# Patient Record
Sex: FEMALE | Race: WHITE | Employment: FULL TIME | URBAN - METROPOLITAN AREA
[De-identification: names, ages, dates, MRNs, and addresses within clinical notes are randomized per-mention and may not be internally consistent; named-entity substitution may affect disease eponyms.]

---

## 2017-09-20 ENCOUNTER — OFFICE VISIT (OUTPATIENT)
Dept: OTHER | Facility: HOSPITAL | Age: 52
End: 2017-09-20
Attending: PREVENTIVE MEDICINE

## 2017-09-20 DIAGNOSIS — Z01.84 IMMUNITY STATUS TESTING: Primary | ICD-10-CM

## 2017-09-20 LAB
RUBELLA IGG QUANTITATIVE: 105.8 IU/ML
RUBV IGG SER QL: POSITIVE

## 2017-09-20 PROCEDURE — 86765 RUBEOLA ANTIBODY: CPT

## 2017-09-20 PROCEDURE — 86787 VARICELLA-ZOSTER ANTIBODY: CPT

## 2017-09-20 PROCEDURE — 86735 MUMPS ANTIBODY: CPT

## 2017-09-20 PROCEDURE — 86762 RUBELLA ANTIBODY: CPT

## 2017-09-22 LAB
MEV IGG SER IA-ACNC: NEGATIVE
MUV IGG SER IA-ACNC: POSITIVE
VZV IGG SER IA-ACNC: POSITIVE

## 2017-12-16 ENCOUNTER — OFFICE VISIT (OUTPATIENT)
Dept: INTERNAL MEDICINE CLINIC | Facility: CLINIC | Age: 52
End: 2017-12-16

## 2017-12-16 VITALS
HEIGHT: 67 IN | RESPIRATION RATE: 17 BRPM | SYSTOLIC BLOOD PRESSURE: 114 MMHG | HEART RATE: 72 BPM | BODY MASS INDEX: 21.97 KG/M2 | WEIGHT: 140 LBS | DIASTOLIC BLOOD PRESSURE: 66 MMHG

## 2017-12-16 DIAGNOSIS — Z13.1 DIABETES MELLITUS SCREENING: ICD-10-CM

## 2017-12-16 DIAGNOSIS — E03.9 HYPOTHYROIDISM, UNSPECIFIED TYPE: ICD-10-CM

## 2017-12-16 DIAGNOSIS — Z12.39 BREAST CANCER SCREENING: ICD-10-CM

## 2017-12-16 DIAGNOSIS — Z82.0 FAMILY HISTORY OF NEUROPATHY: ICD-10-CM

## 2017-12-16 DIAGNOSIS — Z00.00 ANNUAL PHYSICAL EXAM: Primary | ICD-10-CM

## 2017-12-16 DIAGNOSIS — Z13.0 SCREENING FOR BLOOD DISEASE: ICD-10-CM

## 2017-12-16 DIAGNOSIS — E04.1 THYROID NODULE: ICD-10-CM

## 2017-12-16 DIAGNOSIS — Z12.11 COLON CANCER SCREENING: ICD-10-CM

## 2017-12-16 DIAGNOSIS — Z13.220 LIPID SCREENING: ICD-10-CM

## 2017-12-16 PROCEDURE — 99386 PREV VISIT NEW AGE 40-64: CPT | Performed by: PHYSICIAN ASSISTANT

## 2017-12-16 NOTE — PROGRESS NOTES
Patient presents with:  Physical: no pap. HPI:  Pt presents as a new patient to establish care and for annual physical.  She sees Gyn for well woman care (Cheli). Last saw in 2016. Will schedule annual visit soon.   Hypothyroid - Reports was Ludwin Blair Packs/day: 0.00      Years: 0.00      Smokeless tobacco: Never Used                      Alcohol use: Yes           0.0 oz/week     Comment: occasional        Current Outpatient Prescriptions:  Levothyroxine today). Mammo. Schedule OV with Gyn. Colonosocpy ordered (pt agreed to pursue). Check screening labs. Hypothyroidism, unspecified type  - Check TSH and FT4.   FT3 ordered at pt's request.  Understands that if abnormal she will be referred to Endo for m

## 2018-01-15 ENCOUNTER — LAB ENCOUNTER (OUTPATIENT)
Dept: LAB | Age: 53
End: 2018-01-15
Attending: PHYSICIAN ASSISTANT
Payer: COMMERCIAL

## 2018-01-15 DIAGNOSIS — Z82.0 FAMILY HISTORY OF NEUROPATHY: ICD-10-CM

## 2018-01-15 DIAGNOSIS — E03.9 HYPOTHYROIDISM, UNSPECIFIED TYPE: ICD-10-CM

## 2018-01-15 DIAGNOSIS — Z13.0 SCREENING FOR BLOOD DISEASE: ICD-10-CM

## 2018-01-15 DIAGNOSIS — Z13.220 LIPID SCREENING: ICD-10-CM

## 2018-01-15 DIAGNOSIS — Z00.00 ANNUAL PHYSICAL EXAM: ICD-10-CM

## 2018-01-15 DIAGNOSIS — Z13.1 DIABETES MELLITUS SCREENING: ICD-10-CM

## 2018-01-15 LAB
ALBUMIN SERPL-MCNC: 3.7 G/DL (ref 3.5–4.8)
ALP LIVER SERPL-CCNC: 64 U/L (ref 41–108)
ALT SERPL-CCNC: 23 U/L (ref 14–54)
AST SERPL-CCNC: 28 U/L (ref 15–41)
BASOPHILS # BLD AUTO: 0.02 X10(3) UL (ref 0–0.1)
BASOPHILS NFR BLD AUTO: 0.4 %
BILIRUB SERPL-MCNC: 0.4 MG/DL (ref 0.1–2)
BUN BLD-MCNC: 8 MG/DL (ref 8–20)
CALCIUM BLD-MCNC: 9.1 MG/DL (ref 8.3–10.3)
CHLORIDE: 107 MMOL/L (ref 101–111)
CHOLEST SMN-MCNC: 192 MG/DL (ref ?–200)
CO2: 29 MMOL/L (ref 22–32)
CREAT BLD-MCNC: 0.8 MG/DL (ref 0.55–1.02)
EOSINOPHIL # BLD AUTO: 0.14 X10(3) UL (ref 0–0.3)
EOSINOPHIL NFR BLD AUTO: 2.5 %
ERYTHROCYTE [DISTWIDTH] IN BLOOD BY AUTOMATED COUNT: 11.7 % (ref 11.5–16)
FREE T4: 1.2 NG/DL (ref 0.9–1.8)
GLUCOSE BLD-MCNC: 97 MG/DL (ref 70–99)
HAV AB SERPL IA-ACNC: 676 PG/ML (ref 193–986)
HCT VFR BLD AUTO: 40.4 % (ref 34–50)
HDLC SERPL-MCNC: 71 MG/DL (ref 45–?)
HDLC SERPL: 2.7 {RATIO} (ref ?–4.44)
HGB BLD-MCNC: 13.9 G/DL (ref 12–16)
IMMATURE GRANULOCYTE COUNT: 0.01 X10(3) UL (ref 0–1)
IMMATURE GRANULOCYTE RATIO %: 0.2 %
LDLC SERPL CALC-MCNC: 107 MG/DL (ref ?–130)
LYMPHOCYTES # BLD AUTO: 2.03 X10(3) UL (ref 0.9–4)
LYMPHOCYTES NFR BLD AUTO: 36.2 %
M PROTEIN MFR SERPL ELPH: 7 G/DL (ref 6.1–8.3)
MCH RBC QN AUTO: 31.7 PG (ref 27–33.2)
MCHC RBC AUTO-ENTMCNC: 34.4 G/DL (ref 31–37)
MCV RBC AUTO: 92 FL (ref 81–100)
MONOCYTES # BLD AUTO: 0.5 X10(3) UL (ref 0.1–0.6)
MONOCYTES NFR BLD AUTO: 8.9 %
NEUTROPHIL ABS PRELIM: 2.91 X10 (3) UL (ref 1.3–6.7)
NEUTROPHILS # BLD AUTO: 2.91 X10(3) UL (ref 1.3–6.7)
NEUTROPHILS NFR BLD AUTO: 51.8 %
NONHDLC SERPL-MCNC: 121 MG/DL (ref ?–130)
PLATELET # BLD AUTO: 213 10(3)UL (ref 150–450)
POTASSIUM SERPL-SCNC: 4.6 MMOL/L (ref 3.6–5.1)
RBC # BLD AUTO: 4.39 X10(6)UL (ref 3.8–5.1)
RED CELL DISTRIBUTION WIDTH-SD: 39.7 FL (ref 35.1–46.3)
SODIUM SERPL-SCNC: 140 MMOL/L (ref 136–144)
T3FREE SERPL-MCNC: 2.34 PG/ML (ref 2.3–4.2)
TRIGL SERPL-MCNC: 68 MG/DL (ref ?–150)
TSI SER-ACNC: 1.45 MIU/ML (ref 0.35–5.5)
VLDLC SERPL CALC-MCNC: 14 MG/DL (ref 5–40)
WBC # BLD AUTO: 5.6 X10(3) UL (ref 4–13)

## 2018-01-15 PROCEDURE — 84481 FREE ASSAY (FT-3): CPT

## 2018-01-15 PROCEDURE — 36415 COLL VENOUS BLD VENIPUNCTURE: CPT

## 2018-01-15 PROCEDURE — 85025 COMPLETE CBC W/AUTO DIFF WBC: CPT

## 2018-01-15 PROCEDURE — 82607 VITAMIN B-12: CPT

## 2018-01-15 PROCEDURE — 80061 LIPID PANEL: CPT

## 2018-01-15 PROCEDURE — 84443 ASSAY THYROID STIM HORMONE: CPT

## 2018-01-15 PROCEDURE — 84439 ASSAY OF FREE THYROXINE: CPT

## 2018-01-15 PROCEDURE — 80053 COMPREHEN METABOLIC PANEL: CPT

## 2019-01-03 ENCOUNTER — OFFICE VISIT (OUTPATIENT)
Dept: OTHER | Facility: HOSPITAL | Age: 54
End: 2019-01-03
Attending: PREVENTIVE MEDICINE
Payer: OTHER MISCELLANEOUS

## 2019-01-03 DIAGNOSIS — Z02.89 VISIT FOR OCCUPATIONAL HEALTH EXAMINATION: Primary | ICD-10-CM

## 2019-01-03 RX ORDER — AZITHROMYCIN 250 MG/1
TABLET, FILM COATED ORAL
Qty: 6 TABLET | Refills: 0 | Status: SHIPPED | OUTPATIENT
Start: 2019-01-03 | End: 2019-02-06 | Stop reason: ALTCHOICE

## 2019-01-03 NOTE — PATIENT INSTRUCTIONS
Z Serge  Take as prescribed    Follow up if needed      Azithromycin tablets  Brand Names: Zithromax, Zithromax Tri-Serge, Zithromax Z-Serge  What is this medicine? AZITHROMYCIN (az ith humaira MYCECILLE sin) is a macrolide antibiotic.  It is used to treat or prevent cert Do not take this medicine with any of the following medications:  · lincomycin  This medicine may also interact with the following medications:  · amiodarone  · antacids  · birth control pills  · cyclosporine  · digoxin  · magnesium  · nelfinavir  · phenyt

## 2019-02-06 ENCOUNTER — OFFICE VISIT (OUTPATIENT)
Dept: FAMILY MEDICINE CLINIC | Facility: CLINIC | Age: 54
End: 2019-02-06
Payer: COMMERCIAL

## 2019-02-06 VITALS
OXYGEN SATURATION: 95 % | SYSTOLIC BLOOD PRESSURE: 120 MMHG | DIASTOLIC BLOOD PRESSURE: 74 MMHG | TEMPERATURE: 99 F | HEIGHT: 66.5 IN | BODY MASS INDEX: 21.12 KG/M2 | HEART RATE: 85 BPM | WEIGHT: 133 LBS

## 2019-02-06 DIAGNOSIS — J40 BRONCHITIS: Primary | ICD-10-CM

## 2019-02-06 PROCEDURE — 99213 OFFICE O/P EST LOW 20 MIN: CPT | Performed by: FAMILY MEDICINE

## 2019-02-06 RX ORDER — PREDNISONE 20 MG/1
TABLET ORAL
Qty: 10 TABLET | Refills: 0 | Status: SHIPPED | OUTPATIENT
Start: 2019-02-06 | End: 2019-04-04

## 2019-02-06 NOTE — PATIENT INSTRUCTIONS
Take prednisone-- 2 tabs once daily for 5 days. Take with food. While you are on steroids it is preferred that you take Tylenol for pain if needed rather than ibuprofen (Motrin/Ibuprofen) or Aleve. Use dayquil/nyquil as needed.  Or you can use delsym for

## 2019-02-06 NOTE — PROGRESS NOTES
CHIEF COMPLAINT:   Patient presents with:  Cough: cough is with phlegm, runny nose, nose congestion, drainage x 3  dys         HPI:   Savanna Mcdonough is a 46year old female who presents for cough for  3  days.   Cough started gradually and is described as ti throat. NECK: supple, non-tender. LUNGS: Normal respiratory rate. Normal effort. Loose cough. B/l rhonchi and wheezing. No rales or crackles. No decreased BS. Normal egophony.   CARDIO: RRR without murmur  LYMPH: No cervical or supraclavicular lymphadeno

## 2019-03-13 ENCOUNTER — TELEPHONE (OUTPATIENT)
Dept: INTERNAL MEDICINE CLINIC | Facility: CLINIC | Age: 54
End: 2019-03-13

## 2019-03-13 DIAGNOSIS — Z00.00 ROUTINE GENERAL MEDICAL EXAMINATION AT A HEALTH CARE FACILITY: Primary | ICD-10-CM

## 2019-03-13 NOTE — TELEPHONE ENCOUNTER
Future Appointments   Date Time Provider Otto Chauhan   4/4/2019  2:00 PM Patrick White PA-C [facility]     Orders to edward-  Pt aware to fast-no call back required

## 2019-04-04 ENCOUNTER — OFFICE VISIT (OUTPATIENT)
Dept: INTERNAL MEDICINE CLINIC | Facility: CLINIC | Age: 54
End: 2019-04-04
Payer: COMMERCIAL

## 2019-04-04 VITALS
DIASTOLIC BLOOD PRESSURE: 74 MMHG | HEART RATE: 76 BPM | HEIGHT: 66.5 IN | SYSTOLIC BLOOD PRESSURE: 118 MMHG | BODY MASS INDEX: 21.66 KG/M2 | WEIGHT: 136.38 LBS | RESPIRATION RATE: 16 BRPM

## 2019-04-04 DIAGNOSIS — Z12.11 COLON CANCER SCREENING: ICD-10-CM

## 2019-04-04 DIAGNOSIS — Z12.39 SCREENING BREAST EXAMINATION: ICD-10-CM

## 2019-04-04 DIAGNOSIS — Z00.00 ANNUAL PHYSICAL EXAM: Primary | ICD-10-CM

## 2019-04-04 DIAGNOSIS — E03.9 HYPOTHYROIDISM, UNSPECIFIED TYPE: ICD-10-CM

## 2019-04-04 DIAGNOSIS — E04.1 THYROID NODULE: ICD-10-CM

## 2019-04-04 DIAGNOSIS — L98.9 SKIN LESION: ICD-10-CM

## 2019-04-04 PROCEDURE — 99396 PREV VISIT EST AGE 40-64: CPT | Performed by: PHYSICIAN ASSISTANT

## 2019-04-04 NOTE — PROGRESS NOTES
Patient presents with:  Physical: cn room 6: patient is here for a physical and discuss what labs she should have      HPI:   Pt presents for annual physical.  Sees gyn for well woman care. Pt is exercising regularly. Doing Yoga a few times a month. 2-4 times a month      Drinks per session: 1 or 2      Binge frequency: Never      Comment: cage done 4/4/19    Drug use: No        Current Outpatient Medications:  Levothyroxine Sodium (UNITHROID) 50 MCG Oral Tab Take 50 mcg by mouth before breakfast. Dis exam  (primary encounter diagnosis) - Sees Gyn tomorrow. Mammo ordered. Discussed colon ca screening methods with pt, she would like to proceed with stool cards. Check screening labs. Thyroid nodule - Overdue for US to recheck.   Hypothyroidism, unspeci

## 2019-04-05 PROCEDURE — 88175 CYTOPATH C/V AUTO FLUID REDO: CPT | Performed by: NURSE PRACTITIONER

## 2019-04-05 PROCEDURE — 87624 HPV HI-RISK TYP POOLED RSLT: CPT | Performed by: NURSE PRACTITIONER

## 2019-04-06 ENCOUNTER — HOSPITAL ENCOUNTER (OUTPATIENT)
Dept: MAMMOGRAPHY | Age: 54
Discharge: HOME | End: 2019-04-06
Attending: PHYSICIAN ASSISTANT
Payer: COMMERCIAL

## 2019-04-06 DIAGNOSIS — Z12.39 SCREENING BREAST EXAMINATION: ICD-10-CM

## 2019-04-06 PROCEDURE — 77067 SCR MAMMO BI INCL CAD: CPT | Performed by: PHYSICIAN ASSISTANT

## 2019-04-06 PROCEDURE — 77063 BREAST TOMOSYNTHESIS BI: CPT | Performed by: PHYSICIAN ASSISTANT

## 2019-04-08 NOTE — PROGRESS NOTES
Benign findings on mammogram but note is made of dense breast tissue. Due to the dense breast tissue a recommendation for Molecular Breast Imaging (MBI) has been made.   This is a supplemental screening test with increased sensitivity for detecting breast

## 2019-04-13 ENCOUNTER — HOSPITAL ENCOUNTER (OUTPATIENT)
Dept: ULTRASOUND IMAGING | Age: 54
Discharge: HOME | End: 2019-04-13
Attending: PHYSICIAN ASSISTANT
Payer: COMMERCIAL

## 2019-04-13 DIAGNOSIS — E04.1 THYROID NODULE: ICD-10-CM

## 2019-04-13 PROCEDURE — 76536 US EXAM OF HEAD AND NECK: CPT | Performed by: PHYSICIAN ASSISTANT

## 2019-04-14 NOTE — PROGRESS NOTES
Two small thyroid nodules noted. Both less than 1 cm in any dimension. Cannot comment on stability as no prior study to compare to. Does pt have any prior reports that would list size, or can she get records from prior Endo who she was seeing?   F/u US r

## 2019-04-15 ENCOUNTER — MED REC SCAN ONLY (OUTPATIENT)
Dept: INTERNAL MEDICINE CLINIC | Facility: CLINIC | Age: 54
End: 2019-04-15

## 2019-04-15 ENCOUNTER — TELEPHONE (OUTPATIENT)
Dept: INTERNAL MEDICINE CLINIC | Facility: CLINIC | Age: 54
End: 2019-04-15

## 2019-04-15 RX ORDER — LEVOTHYROXINE SODIUM 0.05 MG/1
50 TABLET ORAL
Qty: 90 TABLET | Refills: 1 | Status: SHIPPED | OUTPATIENT
Start: 2019-04-15 | End: 2019-11-06

## 2019-04-15 NOTE — PROGRESS NOTES
I did not remember and did not see anything under imaging; I looked under media and reviewed the prior records. Nodules are smaller than previous US results show. Repeat US in 1 year.

## 2019-04-15 NOTE — TELEPHONE ENCOUNTER
Pt needs CB to send in rx for Levothyroxine Sodium (UNITHROID) 50 MCG Oral Tab-send to local [facility] w/refills-she is now going to start getting this med from CB-she has never filled for her before

## 2019-04-15 NOTE — TELEPHONE ENCOUNTER
Called pt to inform rx Levothyroxine Sodium (UNITHROID) 50 MCG #90+ 1rf sent to [address] [place] [address] listed as requested. Pt verbalized understanding and agreed with POC.

## 2019-04-25 ENCOUNTER — LAB ENCOUNTER (OUTPATIENT)
Dept: LAB | Facility: HOSPITAL | Age: 54
End: 2019-04-25
Attending: PHYSICIAN ASSISTANT
Payer: COMMERCIAL

## 2019-04-25 ENCOUNTER — LAB ENCOUNTER (OUTPATIENT)
Dept: LAB | Age: 54
End: 2019-04-25
Attending: PHYSICIAN ASSISTANT
Payer: COMMERCIAL

## 2019-04-25 DIAGNOSIS — E03.9 HYPOTHYROIDISM, UNSPECIFIED TYPE: ICD-10-CM

## 2019-04-25 DIAGNOSIS — Z00.00 ROUTINE GENERAL MEDICAL EXAMINATION AT A HEALTH CARE FACILITY: ICD-10-CM

## 2019-04-25 DIAGNOSIS — Z12.11 COLON CANCER SCREENING: ICD-10-CM

## 2019-04-25 PROCEDURE — 82274 ASSAY TEST FOR BLOOD FECAL: CPT

## 2019-04-25 PROCEDURE — 84481 FREE ASSAY (FT-3): CPT

## 2019-04-25 PROCEDURE — 84439 ASSAY OF FREE THYROXINE: CPT

## 2019-04-25 PROCEDURE — 84443 ASSAY THYROID STIM HORMONE: CPT

## 2019-04-25 PROCEDURE — 80061 LIPID PANEL: CPT

## 2019-04-25 PROCEDURE — 80053 COMPREHEN METABOLIC PANEL: CPT

## 2019-04-25 PROCEDURE — 36415 COLL VENOUS BLD VENIPUNCTURE: CPT

## 2019-04-25 PROCEDURE — 85025 COMPLETE CBC W/AUTO DIFF WBC: CPT

## 2019-05-08 ENCOUNTER — TELEPHONE (OUTPATIENT)
Dept: INTERNAL MEDICINE CLINIC | Facility: CLINIC | Age: 54
End: 2019-05-08

## 2019-05-08 DIAGNOSIS — Z12.11 COLON CANCER SCREENING: Primary | ICD-10-CM

## 2019-05-08 NOTE — TELEPHONE ENCOUNTER
Dominic chester called and stated that the lab order \"test feces for blood\" needs to be an order for OCCULT BLOOD, FECAL, IMMUNOASSAY     Please advise

## 2019-05-24 ENCOUNTER — TELEPHONE (OUTPATIENT)
Dept: INTERNAL MEDICINE CLINIC | Facility: CLINIC | Age: 54
End: 2019-05-24

## 2019-05-24 NOTE — TELEPHONE ENCOUNTER
Pt came to drop off Health Provider Screening Form for completion. Placing in yellow fax folder up front.   Please fax when done    Thank you

## 2019-06-14 ENCOUNTER — APPOINTMENT (OUTPATIENT)
Dept: LAB | Age: 54
End: 2019-06-14
Attending: DERMATOLOGY
Payer: COMMERCIAL

## 2019-06-14 DIAGNOSIS — C44.41 BCC (BASAL CELL CARCINOMA), SCALP/NECK: ICD-10-CM

## 2019-06-14 DIAGNOSIS — D48.5 NEOPLASM OF UNCERTAIN BEHAVIOR OF SKIN: ICD-10-CM

## 2019-06-14 DIAGNOSIS — C44.612 BASAL CELL CARCINOMA (BCC) OF RIGHT SHOULDER: ICD-10-CM

## 2019-06-14 PROCEDURE — 88305 TISSUE EXAM BY PATHOLOGIST: CPT

## 2019-07-05 ENCOUNTER — APPOINTMENT (OUTPATIENT)
Dept: LAB | Age: 54
End: 2019-07-05
Attending: DERMATOLOGY
Payer: COMMERCIAL

## 2019-07-05 DIAGNOSIS — C44.619 BASAL CELL CARCINOMA (BCC) OF LEFT SHOULDER: ICD-10-CM

## 2019-07-05 PROCEDURE — 88305 TISSUE EXAM BY PATHOLOGIST: CPT

## 2019-11-06 RX ORDER — LEVOTHYROXINE SODIUM 50 UG/1
TABLET ORAL
Qty: 90 TABLET | Refills: 0 | Status: SHIPPED | OUTPATIENT
Start: 2019-11-06 | End: 2020-02-06

## 2020-02-06 RX ORDER — LEVOTHYROXINE SODIUM 50 UG/1
TABLET ORAL
Qty: 90 TABLET | Refills: 0 | Status: SHIPPED | OUTPATIENT
Start: 2020-02-06 | End: 2020-04-14

## 2020-02-07 ENCOUNTER — TELEPHONE (OUTPATIENT)
Dept: INTERNAL MEDICINE CLINIC | Facility: CLINIC | Age: 55
End: 2020-02-07

## 2020-02-07 DIAGNOSIS — Z13.29 SCREENING FOR THYROID DISORDER: ICD-10-CM

## 2020-02-07 DIAGNOSIS — Z00.00 ROUTINE GENERAL MEDICAL EXAMINATION AT A HEALTH CARE FACILITY: Primary | ICD-10-CM

## 2020-02-07 DIAGNOSIS — Z13.0 SCREENING FOR BLOOD DISEASE: ICD-10-CM

## 2020-02-07 DIAGNOSIS — Z13.228 SCREENING FOR METABOLIC DISORDER: ICD-10-CM

## 2020-02-07 DIAGNOSIS — Z13.220 SCREENING FOR LIPID DISORDERS: ICD-10-CM

## 2020-02-07 NOTE — TELEPHONE ENCOUNTER
Future Appointments   Date Time Provider Otto Gissel   3/7/2020  8:00 AM Livia Cunningham PA-C [facility]     Orders to edward- Pt informed that labs need to be completed no sooner than 2 weeks prior to the appt.  Pt aware to fast-no call b

## 2020-02-11 ENCOUNTER — TELEPHONE (OUTPATIENT)
Dept: INTERNAL MEDICINE CLINIC | Facility: CLINIC | Age: 55
End: 2020-02-11

## 2020-02-11 NOTE — TELEPHONE ENCOUNTER
Future Appointments   Date Time Provider Otto Gissel   4/29/2020  8:00 AM Isidro Guevara PA-C [facility]     Orders to edward-  Pt informed that labs need to be completed no sooner than 2 weeks prior to the appt.  Pt aware to fast-no call

## 2020-03-17 ENCOUNTER — TELEPHONE (OUTPATIENT)
Dept: INTERNAL MEDICINE CLINIC | Facility: CLINIC | Age: 55
End: 2020-03-17

## 2020-03-17 RX ORDER — AZITHROMYCIN 250 MG/1
TABLET, FILM COATED ORAL
Qty: 6 TABLET | Refills: 0 | Status: SHIPPED | OUTPATIENT
Start: 2020-03-17 | End: 2020-04-14

## 2020-03-17 NOTE — TELEPHONE ENCOUNTER
D/w pt. She has productive cough, congestion, and some SOB with activity today. No travel history or contact with anyone with known coronavirus. No fevers. Does not meet criteria for coronavirus testing.  Will send azithromycin for her, can use otc cough

## 2020-03-17 NOTE — TELEPHONE ENCOUNTER
Pt works for [employer]. Went upstairs and was short of breath, pt stated no fever, also has a cold and cough, it's productive, mostly in the am.  Was sent home from work and advised to pcp. Please advise.

## 2020-03-17 NOTE — TELEPHONE ENCOUNTER
C/o SOB at work while going up stairs. Happen again after she was sent home from work going up stairs in garage. Cough more in the mornings - productive - can have color to it.      Cold symptoms for about a week    SOB is not something that has happen

## 2020-03-20 ENCOUNTER — PATIENT MESSAGE (OUTPATIENT)
Dept: INTERNAL MEDICINE CLINIC | Facility: CLINIC | Age: 55
End: 2020-03-20

## 2020-03-20 NOTE — TELEPHONE ENCOUNTER
From: Augusta Hogan  To: Darian Franco MD  Sent: 3/20/2020 4:19 PM CDT  Subject: Other    Good afternoon. Just wanted to update you. This morning I recorded my first elevated temp of 99.5. This afternoon it is slightly higher but still below 100.  I've

## 2020-03-26 ENCOUNTER — TELEPHONE (OUTPATIENT)
Dept: INTERNAL MEDICINE CLINIC | Facility: CLINIC | Age: 55
End: 2020-03-26

## 2020-03-26 DIAGNOSIS — R05.9 COUGH: ICD-10-CM

## 2020-03-26 DIAGNOSIS — R07.89 CHEST TIGHTNESS: Primary | ICD-10-CM

## 2020-03-26 DIAGNOSIS — Z87.898 HISTORY OF FEVER: ICD-10-CM

## 2020-03-26 PROCEDURE — 99441 PHONE E/M BY PHYS 5-10 MIN: CPT | Performed by: INTERNAL MEDICINE

## 2020-03-26 RX ORDER — ALBUTEROL SULFATE 90 UG/1
2 AEROSOL, METERED RESPIRATORY (INHALATION) EVERY 4 HOURS PRN
Qty: 1 INHALER | Refills: 1 | Status: SHIPPED | OUTPATIENT
Start: 2020-03-26

## 2020-03-26 NOTE — TELEPHONE ENCOUNTER
Virtual/Telephone Check-In    Christo Geiger verbally consents to a Virtual/Telephone Check-In service on 03/26/20. Patient understands and accepts financial responsibility for any deductible, co-insurance and/or co-pays associated with this service.     Du

## 2020-03-26 NOTE — TELEPHONE ENCOUNTER
Patient is experiencing the shortness of breath just walking around again the last two days and a rapid pulse, worse yesterday. No fever since Monday. Some coughing and slightly productive.   When patient takes a deep breath it's not the way it was a week

## 2020-03-30 ENCOUNTER — HOSPITAL ENCOUNTER (OUTPATIENT)
Dept: GENERAL RADIOLOGY | Facility: HOSPITAL | Age: 55
Discharge: HOME | End: 2020-03-30
Attending: INTERNAL MEDICINE
Payer: COMMERCIAL

## 2020-03-30 DIAGNOSIS — Z87.898 HISTORY OF FEVER: ICD-10-CM

## 2020-03-30 DIAGNOSIS — R07.89 CHEST TIGHTNESS: ICD-10-CM

## 2020-03-30 DIAGNOSIS — R05.9 COUGH: ICD-10-CM

## 2020-03-30 PROCEDURE — 71046 X-RAY EXAM CHEST 2 VIEWS: CPT | Performed by: INTERNAL MEDICINE

## 2020-03-31 ENCOUNTER — TELEPHONE (OUTPATIENT)
Dept: INTERNAL MEDICINE CLINIC | Facility: CLINIC | Age: 55
End: 2020-03-31

## 2020-03-31 DIAGNOSIS — R50.9 FEVER AND CHILLS: Primary | ICD-10-CM

## 2020-03-31 DIAGNOSIS — R07.89 CHEST TIGHTNESS: ICD-10-CM

## 2020-03-31 DIAGNOSIS — R05.3 PERSISTENT COUGH: ICD-10-CM

## 2020-03-31 NOTE — TELEPHONE ENCOUNTER
cxr reviewed with patient  She still has fever (99F), cough, some chest tightness. Albuterol and tylenol helps. Finished zpak. Works in [employer] and would like to know when she could go back.   Will order COVID 19 testing due to fevers, cough, health care worke

## 2020-04-01 ENCOUNTER — TELEPHONE (OUTPATIENT)
Dept: INTERNAL MEDICINE CLINIC | Facility: CLINIC | Age: 55
End: 2020-04-01

## 2020-04-01 NOTE — TELEPHONE ENCOUNTER
Patient notified Henrine Pill 19 testing denied. Pt will be seeing [facility] Friday 4/3/20 for a Physical Exam.  Pt has paperwork for [employer] [facility] [employer] that TB needs to fill out for FMLA, 1st day off of work was 3/17/20 and back to work on 4/7/20.   Pt will fax to our

## 2020-04-01 NOTE — TELEPHONE ENCOUNTER
FW: COVID testing   Received: Today   Message Contents   Victorina Leal MD  P [facility]             Please let pt know COVID 19 test denied and cancel the test.  I did tell her that it may be denied due to availability yesterday.  I would advi

## 2020-04-06 NOTE — TELEPHONE ENCOUNTER
D/w patient. Still with low grade fever (99.2F) and SOB at times. Not feeling good overall. Discussed I do not recommend going back to work until fever free for at least 72 hours and symptoms resolved for 1 week. I will write a letter stating above.  Will

## 2020-04-07 ENCOUNTER — TELEPHONE (OUTPATIENT)
Dept: INTERNAL MEDICINE CLINIC | Facility: CLINIC | Age: 55
End: 2020-04-07

## 2020-04-07 NOTE — TELEPHONE ENCOUNTER
Received paperwork from [employer] in regards to diability benefits. Placed in TB bin for review/completion.

## 2020-04-08 NOTE — TELEPHONE ENCOUNTER
Calling pt for f/u call on how she is feeling.     Pt states she is doing ok, T=99.0 this am, still has some shortness of breath but not worsening from the call with TB, started as SOB with exertion and now if she is reading a book has some shortness of christiano

## 2020-04-08 NOTE — TELEPHONE ENCOUNTER
Patient notified we will talk to her Monday to get an update on how she is feeling and possibly schedule appt with TB for Tuesday. Pt verbalizes understanding and agreeable to plan. Hold for Monday 4/13/20.

## 2020-04-08 NOTE — TELEPHONE ENCOUNTER
Yes our employee health cleared her back to work, her SOB may be related to anxiety. CXR was normal. Need to document pulse ox and evaluate her in OV in order for her to continue to be off from work. Please see how symptoms are Monday.  May need OV with m

## 2020-04-09 ENCOUNTER — TELEPHONE (OUTPATIENT)
Dept: INTERNAL MEDICINE CLINIC | Facility: CLINIC | Age: 55
End: 2020-04-09

## 2020-04-09 NOTE — TELEPHONE ENCOUNTER
Called to see how she is feeling. Having a better day with no headache finally. SOB is improved. Last temp yesterday 99F  Symptoms started 3/17, CXR negative, COVID testing denied.   Spoke with [facility], unable to do COVID testing at this kari

## 2020-04-09 NOTE — TELEPHONE ENCOUNTER
TB indicates she talked to pt and [address] [place] [address] today, If pt is not better by Monday 4/13/20 then she has to go to the ER for work up of shortness of breath. No visit to our clinic per TB.

## 2020-04-14 ENCOUNTER — HOSPITAL ENCOUNTER (EMERGENCY)
Facility: HOSPITAL | Age: 55
Discharge: HOME | End: 2020-04-14
Attending: EMERGENCY MEDICINE
Payer: COMMERCIAL

## 2020-04-14 VITALS
DIASTOLIC BLOOD PRESSURE: 63 MMHG | TEMPERATURE: 98 F | SYSTOLIC BLOOD PRESSURE: 116 MMHG | HEART RATE: 68 BPM | OXYGEN SATURATION: 98 % | RESPIRATION RATE: 16 BRPM | BODY MASS INDEX: 22.5 KG/M2 | WEIGHT: 140 LBS | HEIGHT: 66 IN

## 2020-04-14 DIAGNOSIS — R06.00 DYSPNEA, UNSPECIFIED TYPE: Primary | ICD-10-CM

## 2020-04-14 PROCEDURE — 93010 ELECTROCARDIOGRAM REPORT: CPT

## 2020-04-14 PROCEDURE — 84484 ASSAY OF TROPONIN QUANT: CPT | Performed by: EMERGENCY MEDICINE

## 2020-04-14 PROCEDURE — 93005 ELECTROCARDIOGRAM TRACING: CPT

## 2020-04-14 PROCEDURE — 99284 EMERGENCY DEPT VISIT MOD MDM: CPT

## 2020-04-14 PROCEDURE — 36415 COLL VENOUS BLD VENIPUNCTURE: CPT

## 2020-04-14 PROCEDURE — 99285 EMERGENCY DEPT VISIT HI MDM: CPT

## 2020-04-14 PROCEDURE — 80053 COMPREHEN METABOLIC PANEL: CPT | Performed by: EMERGENCY MEDICINE

## 2020-04-14 PROCEDURE — 84439 ASSAY OF FREE THYROXINE: CPT | Performed by: EMERGENCY MEDICINE

## 2020-04-14 PROCEDURE — 84443 ASSAY THYROID STIM HORMONE: CPT | Performed by: EMERGENCY MEDICINE

## 2020-04-14 PROCEDURE — 85025 COMPLETE CBC W/AUTO DIFF WBC: CPT | Performed by: EMERGENCY MEDICINE

## 2020-04-14 PROCEDURE — 85379 FIBRIN DEGRADATION QUANT: CPT | Performed by: EMERGENCY MEDICINE

## 2020-04-14 RX ORDER — MULTIVITAMIN WITH IRON
50 TABLET ORAL DAILY
COMMUNITY
End: 2021-03-31

## 2020-04-14 RX ORDER — MULTIVITAMIN WITH FOLIC ACID 400 MCG
1 TABLET ORAL DAILY
COMMUNITY

## 2020-04-14 RX ORDER — LEVOTHYROXINE SODIUM 0.05 MG/1
50 TABLET ORAL
COMMUNITY
End: 2020-05-11

## 2020-04-14 NOTE — ED PROVIDER NOTES
1545:  Patient's laboratory work-up revealed unremarkable troponin, d-dimer, CBC and CMP. Patient's COVID-19 test was negative.   Patient has not experienced any shortness of breath only emergency department and she and I discussed her results and need to

## 2020-04-14 NOTE — ED INITIAL ASSESSMENT (HPI)
Pt states she's been having cough and fever since 03/17/20. Worse mal on exertion since yeterday.  Not tested for covid just yet

## 2020-04-14 NOTE — ED PROVIDER NOTES
Patient Seen in: [facility] Emergency Department      History   Patient presents with:  Dyspnea JUSTINE SOB  Fever    Stated Complaint: shortness of breath x 4 weeks, low grade temp    HPI    50-year-old female sent to the emergency room by her primary [04/14/20 1205]   /79   Pulse 89   Resp 16   Temp 98.2 °F (36.8 °C)   Temp src Tympanic   SpO2 95 %   O2 Device None (Room air)       Current:/63   Pulse 68   Temp 98.2 °F (36.8 °C) (Tympanic)   Resp 16   Ht 167.6 cm (5' 6\")   Wt 63.5 kg   SpO results. Patient is well-appearing at this time. If work-up is unremarkable anticipate discharge home with further work-up to be performed by primary care doctor. Patient is comfortable with plan.   Await results    Signed out to my colleague awaiting re

## 2020-04-15 ENCOUNTER — VIRTUAL PHONE E/M (OUTPATIENT)
Dept: INTERNAL MEDICINE CLINIC | Facility: CLINIC | Age: 55
End: 2020-04-15

## 2020-04-15 ENCOUNTER — TELEPHONE (OUTPATIENT)
Dept: INTERNAL MEDICINE CLINIC | Facility: CLINIC | Age: 55
End: 2020-04-15

## 2020-04-15 DIAGNOSIS — R50.9 LOW GRADE FEVER: ICD-10-CM

## 2020-04-15 DIAGNOSIS — R06.00 DOE (DYSPNEA ON EXERTION): Primary | ICD-10-CM

## 2020-04-15 DIAGNOSIS — R94.31 ABNORMAL EKG: ICD-10-CM

## 2020-04-15 DIAGNOSIS — J40 BRONCHITIS: ICD-10-CM

## 2020-04-15 PROCEDURE — 99214 OFFICE O/P EST MOD 30 MIN: CPT | Performed by: INTERNAL MEDICINE

## 2020-04-15 NOTE — TELEPHONE ENCOUNTER
Patient is scheduled      Future Appointments   Date Time Provider Otto Gissel   4/15/2020  2:40 PM Raven Jordan MD [facility]   7/25/2020  8:30 AM Livia Cunningham PA-C [facility]

## 2020-04-15 NOTE — TELEPHONE ENCOUNTER
Message   Received: Today   Message Contents   Lexy López MD  P [facility] Clinical Staff          Previous Messages      ----- Message -----   From: Torri Benson MD   Sent: 4/14/2020   7:33 PM CDT   To:  Sapphire Francisco MD      Routed Note     Author: DANG

## 2020-04-15 NOTE — PROGRESS NOTES
Due to COVID-19 ACTION PLAN, the patient's office visit was converted to a phone visit. Patient understands and accepts financial responsibility for any deductible, co-insurance and/or co-pays associated with this service.       Subjective     HPI:   Joy MD Yee Marie understands phone evaluation is not a substitute for face-to-face examination or emergency care. Patient advised to go to ER or call 911 for worsening symptoms or acute distress.      Please note that the following visit was completed us

## 2020-04-17 ENCOUNTER — TELEPHONE (OUTPATIENT)
Dept: INTERNAL MEDICINE CLINIC | Facility: CLINIC | Age: 55
End: 2020-04-17

## 2020-04-17 NOTE — TELEPHONE ENCOUNTER
Paperwork received from [employer] requesting additional documentation due to pt's recent televisit and ER visit. Placed in TB bin for review.

## 2020-04-23 ENCOUNTER — VIRTUAL PHONE E/M (OUTPATIENT)
Dept: INTERNAL MEDICINE CLINIC | Facility: CLINIC | Age: 55
End: 2020-04-23
Payer: COMMERCIAL

## 2020-04-23 DIAGNOSIS — J98.9 REACTIVE AIRWAY DISEASE THAT IS NOT ASTHMA: Primary | ICD-10-CM

## 2020-04-23 PROCEDURE — 99213 OFFICE O/P EST LOW 20 MIN: CPT | Performed by: INTERNAL MEDICINE

## 2020-04-23 NOTE — TELEPHONE ENCOUNTER
Pt called stating the [address] [place] [address] does not have her FMLA paperwork yet-please re fax to [phone] to have return of date of 5/1/20 on it

## 2020-04-23 NOTE — PROGRESS NOTES
Due to COVID-19 ACTION PLAN, the patient's office visit was converted to a phone or video visit. Patient understands and accepts financial responsibility for any deductible, co-insurance and/or co-pays associated with this service.   Time Spent:15 min    S relationship, due to the on-going public health crisis/national emergency and because of restrictions of visitation. There are limitations of this visit as no physical exam could be performed.   Every conscious effort was taken to allow for sufficient and

## 2020-05-11 RX ORDER — LEVOTHYROXINE SODIUM 50 UG/1
TABLET ORAL
Qty: 90 TABLET | Refills: 0 | Status: SHIPPED | OUTPATIENT
Start: 2020-05-11 | End: 2020-08-10

## 2020-05-29 DIAGNOSIS — Z01.812 PRE-PROCEDURE LAB EXAM: Primary | ICD-10-CM

## 2020-06-01 ENCOUNTER — HOSPITAL ENCOUNTER (OUTPATIENT)
Dept: CV DIAGNOSTICS | Facility: HOSPITAL | Age: 55
Discharge: HOME | End: 2020-06-01
Attending: INTERNAL MEDICINE
Payer: COMMERCIAL

## 2020-06-01 DIAGNOSIS — R94.31 ABNORMAL EKG: ICD-10-CM

## 2020-06-01 DIAGNOSIS — R06.00 DOE (DYSPNEA ON EXERTION): ICD-10-CM

## 2020-06-01 PROCEDURE — 93350 STRESS TTE ONLY: CPT | Performed by: INTERNAL MEDICINE

## 2020-06-01 PROCEDURE — 93017 CV STRESS TEST TRACING ONLY: CPT | Performed by: INTERNAL MEDICINE

## 2020-06-01 PROCEDURE — 93018 CV STRESS TEST I&R ONLY: CPT | Performed by: INTERNAL MEDICINE

## 2020-06-17 ENCOUNTER — PATIENT MESSAGE (OUTPATIENT)
Dept: INTERNAL MEDICINE CLINIC | Facility: CLINIC | Age: 55
End: 2020-06-17

## 2020-06-17 DIAGNOSIS — R06.00 DOE (DYSPNEA ON EXERTION): Primary | ICD-10-CM

## 2020-06-17 DIAGNOSIS — J98.9 REACTIVE AIRWAY DISEASE THAT IS NOT ASTHMA: ICD-10-CM

## 2020-06-17 NOTE — TELEPHONE ENCOUNTER
From: Eva Alejo  To: Gracia Meng MD  Sent: 6/17/2020 7:28 AM CDT  Subject: Other    Condition Update: no more shortness of breath going up stairs, which I do every day for work, two flights twice a day. Also increased my walking distance and speed.

## 2020-06-17 NOTE — TELEPHONE ENCOUNTER
From: Jayla Elizalde  To: Yosef Evans MD  Sent: 6/17/2020 8:17 AM CDT  Subject: Other    Also, [facility] rx for Flovent needs to be \"90 day supply or the medicine won't be covered\" per insurance. Thanks.  Have a good day :)

## 2020-06-17 NOTE — TELEPHONE ENCOUNTER
LOV:4/4/19, CPE, CB  Last CPE:4/4/19  Last refill:Fluticasone Propionate  MCG/ACT Inhalation Aerosol-4/23/20  Quantity:12g, 1 refill  Last labs that are related: n/a  Future appointment:  Future Appointments   Date Time Provider Otto Chauhan

## 2020-06-18 NOTE — TELEPHONE ENCOUNTER
This is long time to still have CHRIS.  Stress test was normal. I would like her to see pulmonary for evaluation, please refer to Dr. Blanca terry.   I think script already sent

## 2020-06-22 ENCOUNTER — TELEPHONE (OUTPATIENT)
Dept: INTERNAL MEDICINE CLINIC | Facility: CLINIC | Age: 55
End: 2020-06-22

## 2020-06-22 DIAGNOSIS — J98.9 REACTIVE AIRWAY DISEASE THAT IS NOT ASTHMA: ICD-10-CM

## 2020-06-22 NOTE — TELEPHONE ENCOUNTER
Pharmacy states patient needs a 3 month supply pended for a 3 month supply please approve if appropriate

## 2020-06-24 DIAGNOSIS — Z78.9 PARTICIPANT IN HEALTH AND WELLNESS PLAN: Primary | ICD-10-CM

## 2020-07-01 ENCOUNTER — NURSE ONLY (OUTPATIENT)
Dept: LAB | Age: 55
End: 2020-07-01
Attending: PREVENTIVE MEDICINE

## 2020-07-01 DIAGNOSIS — Z78.9 PARTICIPANT IN HEALTH AND WELLNESS PLAN: ICD-10-CM

## 2020-07-01 LAB — SARS-COV-2 IGG SERPLBLD QL IA.RAPID: NEGATIVE

## 2020-07-01 PROCEDURE — 86769 SARS-COV-2 COVID-19 ANTIBODY: CPT

## 2020-07-25 ENCOUNTER — OFFICE VISIT (OUTPATIENT)
Dept: INTERNAL MEDICINE CLINIC | Facility: CLINIC | Age: 55
End: 2020-07-25
Payer: COMMERCIAL

## 2020-07-25 VITALS
RESPIRATION RATE: 16 BRPM | TEMPERATURE: 98 F | BODY MASS INDEX: 23.98 KG/M2 | HEIGHT: 66.54 IN | SYSTOLIC BLOOD PRESSURE: 102 MMHG | DIASTOLIC BLOOD PRESSURE: 68 MMHG | WEIGHT: 151 LBS | HEART RATE: 76 BPM

## 2020-07-25 DIAGNOSIS — R74.01 TRANSAMINITIS: ICD-10-CM

## 2020-07-25 DIAGNOSIS — Z12.11 COLON CANCER SCREENING: ICD-10-CM

## 2020-07-25 DIAGNOSIS — Z00.00 ANNUAL PHYSICAL EXAM: Primary | ICD-10-CM

## 2020-07-25 DIAGNOSIS — E04.1 THYROID NODULE: ICD-10-CM

## 2020-07-25 DIAGNOSIS — Z13.220 LIPID SCREENING: ICD-10-CM

## 2020-07-25 PROCEDURE — 3078F DIAST BP <80 MM HG: CPT | Performed by: PHYSICIAN ASSISTANT

## 2020-07-25 PROCEDURE — 3074F SYST BP LT 130 MM HG: CPT | Performed by: PHYSICIAN ASSISTANT

## 2020-07-25 PROCEDURE — 3008F BODY MASS INDEX DOCD: CPT | Performed by: PHYSICIAN ASSISTANT

## 2020-07-25 PROCEDURE — 99396 PREV VISIT EST AGE 40-64: CPT | Performed by: PHYSICIAN ASSISTANT

## 2020-07-25 NOTE — PROGRESS NOTES
Patient presents with:  Wellness Visit: SN Rm 6; gyne for mammogram/pap      HPI:  Pt presents for annual physical.  Pt is exercising regularly, still does yoga periodically as well. Watches diet. Follows with Gyn for well woman care.     Thinks had COVID Former Smoker        Types: Cigarettes      Smokeless tobacco: Never Used      Tobacco comment: ~20 pack years    Alcohol use:  Yes      Alcohol/week: 0.0 standard drinks      Frequency: 2-4 times a month      Drinks per session: 1 or 2      Binge frequency Neck: Normal range of motion. Neck supple. No carotid bruits bilaterally. No edema present. Cardiovascular: Normal rate, regular rhythm and intact distal pulses. No murmur, rubs or gallops.    Pulmonary/Chest: Effort normal and breath sounds normal. No

## 2020-07-31 ENCOUNTER — HOSPITAL ENCOUNTER (OUTPATIENT)
Dept: MAMMOGRAPHY | Facility: HOSPITAL | Age: 55
Discharge: HOME | End: 2020-07-31
Attending: NURSE PRACTITIONER
Payer: COMMERCIAL

## 2020-07-31 DIAGNOSIS — Z12.31 ENCOUNTER FOR SCREENING MAMMOGRAM FOR BREAST CANCER: ICD-10-CM

## 2020-07-31 PROCEDURE — 77063 BREAST TOMOSYNTHESIS BI: CPT | Performed by: NURSE PRACTITIONER

## 2020-07-31 PROCEDURE — 77067 SCR MAMMO BI INCL CAD: CPT | Performed by: NURSE PRACTITIONER

## 2020-08-04 ENCOUNTER — LAB ENCOUNTER (OUTPATIENT)
Dept: LAB | Age: 55
End: 2020-08-04
Attending: PHYSICIAN ASSISTANT
Payer: COMMERCIAL

## 2020-08-04 DIAGNOSIS — Z13.220 LIPID SCREENING: ICD-10-CM

## 2020-08-04 DIAGNOSIS — R74.01 TRANSAMINITIS: ICD-10-CM

## 2020-08-04 LAB
ALBUMIN SERPL-MCNC: 3.8 G/DL (ref 3.4–5)
ALP LIVER SERPL-CCNC: 79 U/L (ref 46–118)
ALT SERPL-CCNC: 26 U/L (ref 13–56)
AST SERPL-CCNC: 28 U/L (ref 15–37)
BILIRUB DIRECT SERPL-MCNC: <0.1 MG/DL (ref 0–0.2)
BILIRUB SERPL-MCNC: 0.4 MG/DL (ref 0.1–2)
CHOLEST SMN-MCNC: 212 MG/DL (ref ?–200)
HDLC SERPL-MCNC: 100 MG/DL (ref 40–59)
LDLC SERPL CALC-MCNC: 104 MG/DL (ref ?–100)
M PROTEIN MFR SERPL ELPH: 7.2 G/DL (ref 6.4–8.2)
NONHDLC SERPL-MCNC: 112 MG/DL (ref ?–130)
PATIENT FASTING Y/N/NP: YES
TRIGL SERPL-MCNC: 42 MG/DL (ref 30–149)
VLDLC SERPL CALC-MCNC: 8 MG/DL (ref 0–30)

## 2020-08-04 PROCEDURE — 80061 LIPID PANEL: CPT

## 2020-08-04 PROCEDURE — 36415 COLL VENOUS BLD VENIPUNCTURE: CPT

## 2020-08-04 PROCEDURE — 80076 HEPATIC FUNCTION PANEL: CPT

## 2020-08-06 ENCOUNTER — LAB ENCOUNTER (OUTPATIENT)
Dept: LAB | Facility: HOSPITAL | Age: 55
End: 2020-08-06
Attending: PHYSICIAN ASSISTANT
Payer: COMMERCIAL

## 2020-08-06 DIAGNOSIS — Z12.11 COLON CANCER SCREENING: ICD-10-CM

## 2020-08-06 PROCEDURE — 82274 ASSAY TEST FOR BLOOD FECAL: CPT

## 2020-08-10 RX ORDER — LEVOTHYROXINE SODIUM 50 UG/1
TABLET ORAL
Qty: 90 TABLET | Refills: 1 | Status: SHIPPED | OUTPATIENT
Start: 2020-08-10 | End: 2020-12-30

## 2020-08-12 ENCOUNTER — TELEPHONE (OUTPATIENT)
Dept: INTERNAL MEDICINE CLINIC | Facility: CLINIC | Age: 55
End: 2020-08-12

## 2020-08-12 NOTE — TELEPHONE ENCOUNTER
Received fax from patient with attached screening form for healthy driven. Placed in CB bin to complete.

## 2020-08-13 NOTE — TELEPHONE ENCOUNTER
Called patient to inform her that the healthy driven form is completed on our end but does need her signature before submitting. Please if patient calls back ask how will she sign form.

## 2020-08-14 NOTE — TELEPHONE ENCOUNTER
Spoke with patient she will come tomorrow to sign and please leave form for me to submit for patient. Form is in  drawer under patients last name.

## 2020-08-17 NOTE — TELEPHONE ENCOUNTER
Faxed Completed/Signed Health Provider Screening Form to S#[phone]. Confirmation received.  Sent to scan

## 2020-10-12 ENCOUNTER — TELEPHONE (OUTPATIENT)
Dept: INTERNAL MEDICINE CLINIC | Facility: HOSPITAL | Age: 55
End: 2020-10-12

## 2020-10-12 ENCOUNTER — LAB ENCOUNTER (OUTPATIENT)
Dept: LAB | Age: 55
End: 2020-10-12
Attending: PREVENTIVE MEDICINE
Payer: COMMERCIAL

## 2020-10-12 DIAGNOSIS — Z20.822 SUSPECTED 2019 NOVEL CORONAVIRUS INFECTION: Primary | ICD-10-CM

## 2020-10-12 DIAGNOSIS — Z20.822 SUSPECTED 2019 NOVEL CORONAVIRUS INFECTION: ICD-10-CM

## 2020-12-30 RX ORDER — LEVOTHYROXINE SODIUM 50 UG/1
TABLET ORAL
Qty: 90 TABLET | Refills: 0 | Status: SHIPPED | OUTPATIENT
Start: 2020-12-30 | End: 2021-04-16

## 2020-12-30 NOTE — TELEPHONE ENCOUNTER
Last visit-  07/25/2020 cpe    Last refill-  08/10/2020 unithroid 50mcg QTY90 1R    Last labs-  04/14/2020 d-dimer, tsh+free t4, troponin, cbc, cmp    No future appointments.     Per Protocol- Passed

## 2021-04-28 PROBLEM — M75.101 ROTATOR CUFF SYNDROME OF RIGHT SHOULDER: Status: ACTIVE | Noted: 2021-04-28

## 2021-08-03 ENCOUNTER — HOSPITAL ENCOUNTER (OUTPATIENT)
Dept: MAMMOGRAPHY | Age: 56
Discharge: HOME | End: 2021-08-03
Attending: NURSE PRACTITIONER
Payer: COMMERCIAL

## 2021-08-03 DIAGNOSIS — Z12.31 ENCOUNTER FOR SCREENING MAMMOGRAM FOR MALIGNANT NEOPLASM OF BREAST: ICD-10-CM

## 2021-08-03 PROCEDURE — 77063 BREAST TOMOSYNTHESIS BI: CPT | Performed by: NURSE PRACTITIONER

## 2021-08-03 PROCEDURE — 77067 SCR MAMMO BI INCL CAD: CPT | Performed by: NURSE PRACTITIONER

## 2022-07-13 PROCEDURE — 87624 HPV HI-RISK TYP POOLED RSLT: CPT

## 2022-09-14 ENCOUNTER — HOSPITAL ENCOUNTER (OUTPATIENT)
Dept: MAMMOGRAPHY | Age: 57
Discharge: HOME | End: 2022-09-14
Payer: COMMERCIAL

## 2022-09-14 DIAGNOSIS — Z12.31 ENCOUNTER FOR SCREENING MAMMOGRAM FOR BREAST CANCER: ICD-10-CM

## 2022-09-14 PROCEDURE — 77067 SCR MAMMO BI INCL CAD: CPT

## 2022-09-14 PROCEDURE — 77063 BREAST TOMOSYNTHESIS BI: CPT

## 2022-09-28 ENCOUNTER — TELEPHONE (OUTPATIENT)
Dept: MAMMOGRAPHY | Facility: HOSPITAL | Age: 57
End: 2022-09-28

## 2022-09-28 ENCOUNTER — HOSPITAL ENCOUNTER (OUTPATIENT)
Dept: MAMMOGRAPHY | Facility: HOSPITAL | Age: 57
Discharge: HOME | End: 2022-09-28
Payer: COMMERCIAL

## 2022-09-28 DIAGNOSIS — R92.2 INCONCLUSIVE MAMMOGRAM: ICD-10-CM

## 2022-09-28 PROCEDURE — 76642 ULTRASOUND BREAST LIMITED: CPT

## 2022-09-28 PROCEDURE — 77061 BREAST TOMOSYNTHESIS UNI: CPT

## 2022-09-28 PROCEDURE — 77065 DX MAMMO INCL CAD UNI: CPT

## 2022-09-28 NOTE — TELEPHONE ENCOUNTER
This Breast Care RN phoned pt re right breast 1 site ultrasound guided biopsy recommendation by Dr. Joy Thompson for mass. Procedure reviewed and all questions answered. Emotional support given. Confirmed pt did receive educational handouts and reviewed these with the patient. On the day of the biopsy, pt instructed to take Tylenol 1000mg PO, eat a light meal & bring or wear a sports bra. Post biopsy care also reviewed with pt to include NO lifting more than 5lbs, no exercising or housework (limit upper body movement) for 24-48 hrs post biopsy. Patient denies blood thinners, bleeding disorders, liver disease, and chemo. Pt verbalized understanding. Our breast center schedulers will be calling to schedule an appt that is convenient for pt.

## 2022-10-14 ENCOUNTER — HOSPITAL ENCOUNTER (OUTPATIENT)
Dept: MAMMOGRAPHY | Facility: HOSPITAL | Age: 57
Discharge: HOME | End: 2022-10-14
Payer: COMMERCIAL

## 2022-10-14 DIAGNOSIS — N63.0 BREAST NODULE: ICD-10-CM

## 2022-10-14 PROCEDURE — 88341 IMHCHEM/IMCYTCHM EA ADD ANTB: CPT

## 2022-10-14 PROCEDURE — 88305 TISSUE EXAM BY PATHOLOGIST: CPT

## 2022-10-14 PROCEDURE — 77065 DX MAMMO INCL CAD UNI: CPT

## 2022-10-14 PROCEDURE — 19083 BX BREAST 1ST LESION US IMAG: CPT

## 2022-10-14 PROCEDURE — 88342 IMHCHEM/IMCYTCHM 1ST ANTB: CPT

## 2022-10-17 NOTE — IMAGING NOTE
1542: Spoke with Corey Gupta post ultrasound guided right breast biopsy. Introduced myself as breast care coordinator. Name and date of birth verified. Reinforced post biopsy care and instruction. Corey Gupta denies any issues with biopsy site- bleeding, drainage, redness, tenderness. Pathology results and recommendations shared as follows:   Final Diagnosis:   Breast, right, mass at 10:00, ultrasound core biopsies:  -Breast parenchyma with fibrosis and histiocytic inflammation with        giant cell reaction and calcifications.  -Background florid usual ductal hyperplasia, columnar cell change/hyperplasia,        and apocrine metaplasia. -See comment. Electronically signed by Melissa Morris MD on 10/17/2022 at [phone]     Final pathology:  -breast parenchyma with fibrosis and histiocytes inflammation with joint cell reaction and calcifications. -background florid usual ductal hyperplasia, columnar cell change/hyperplasia, and apocrine metaplasia. Findings are benign and concordant. Six-month follow-up ultrasound is recommended to confirm stability. Dictated by (CST): Pamela Aguila MD on 10/17/2022 at 3:33 PM       Finalized by (CST): Pamela Aguila MD on 10/17/2022 at 3:35 PM       Corey Gupta verbalized understanding and agreement to the above. Ms. Julia Waterman instructed to perform breast self-exams and notify physician of any changes/concerns. Ms. Julia Waterman verbalized agreement.

## (undated) NOTE — LETTER
04/25/19        Jayla Elizalde  [address] [facility]      Dear Pio Roy records indicate that you have outstanding lab work and or testing that was ordered for you and has not yet been completed:  Orders Placed This Encounter

## (undated) NOTE — LETTER
05/22/20        Sage Casillas  [address] [facility]      Dear Anders Leija,    [address] [facility] records indicate that you have outstanding lab work and or testing that was ordered for you and has not yet been completed.  Due to the current COVID-19 outbreak,

## (undated) NOTE — LETTER
Date: 4/6/2020    Patient Name: Maye Foote          To Whom it may concern:    Patient advised to stay home due to low grade fevers and shortness of breath. She will be re- evaluated end of this week for back to work next week.   Please call if any ques